# Patient Record
Sex: MALE | Race: WHITE | NOT HISPANIC OR LATINO | URBAN - NONMETROPOLITAN AREA
[De-identification: names, ages, dates, MRNs, and addresses within clinical notes are randomized per-mention and may not be internally consistent; named-entity substitution may affect disease eponyms.]

---

## 2017-03-10 ENCOUNTER — OFFICE VISIT (OUTPATIENT)
Dept: MEDICAL GROUP | Facility: PHYSICIAN GROUP | Age: 42
End: 2017-03-10
Payer: COMMERCIAL

## 2017-03-10 VITALS
OXYGEN SATURATION: 96 % | BODY MASS INDEX: 34.39 KG/M2 | HEART RATE: 82 BPM | RESPIRATION RATE: 16 BRPM | TEMPERATURE: 97.3 F | WEIGHT: 268 LBS | HEIGHT: 74 IN | SYSTOLIC BLOOD PRESSURE: 144 MMHG | DIASTOLIC BLOOD PRESSURE: 90 MMHG

## 2017-03-10 DIAGNOSIS — G47.33 OBSTRUCTIVE SLEEP APNEA SYNDROME: ICD-10-CM

## 2017-03-10 DIAGNOSIS — J34.89 NASAL OBSTRUCTION: ICD-10-CM

## 2017-03-10 DIAGNOSIS — F33.40 RECURRENT MAJOR DEPRESSIVE DISORDER, IN REMISSION (HCC): ICD-10-CM

## 2017-03-10 DIAGNOSIS — I10 ESSENTIAL HYPERTENSION: ICD-10-CM

## 2017-03-10 PROBLEM — F32.5 MAJOR DEPRESSIVE DISORDER IN REMISSION (HCC): Status: ACTIVE | Noted: 2017-03-10

## 2017-03-10 PROCEDURE — 99204 OFFICE O/P NEW MOD 45 MIN: CPT | Performed by: NURSE PRACTITIONER

## 2017-03-10 RX ORDER — LISINOPRIL 10 MG/1
10 TABLET ORAL DAILY
COMMUNITY
End: 2017-04-21 | Stop reason: SDUPTHER

## 2017-03-10 RX ORDER — FLUOXETINE HYDROCHLORIDE 40 MG/1
40 CAPSULE ORAL DAILY
COMMUNITY
End: 2017-04-21 | Stop reason: SDUPTHER

## 2017-03-10 ASSESSMENT — PATIENT HEALTH QUESTIONNAIRE - PHQ9: CLINICAL INTERPRETATION OF PHQ2 SCORE: 0

## 2017-03-10 NOTE — ASSESSMENT & PLAN NOTE
Symptoms:  Headache no , Weakness no , Visual loss no , Chest pain no , Dyspnea no , Claudication no Swelling no   Taking medication: Lisionpril 10 mg   Diet : standard diet   Exercise: limited.

## 2017-03-10 NOTE — MR AVS SNAPSHOT
"        Jesse Kasrten   3/10/2017 10:00 AM   Office Visit   MRN: [id number]    Department:  [facility]   Dept Phone:  [phone]    Description:  Male : 1975   Provider:  NOE Winters           Reason for Visit     New Patient est care / med mgmt      Allergies as of 3/10/2017     No Known Allergies      You were diagnosed with     Obstructive sleep apnea syndrome   [911173]       Nasal obstruction   [509233]       Essential hypertension   [7025752]       Recurrent major depressive disorder, in remission (CMS-[facility])   [7493643]         Vital Signs     Blood Pressure Pulse Temperature Respirations Height Weight    144/90 mmHg 82 36.3 °C (97.3 °F) 16 1.869 m (6' 1.6\") 121.564 kg (268 lb)    Body Mass Index Oxygen Saturation Smoking Status             34.80 kg/m2 96% Former Smoker         Basic Information     Date Of Birth Sex Race Ethnicity Preferred Language    1975 Male White Non- English      Your appointments     Mar 17, 2017 12:30 PM   Adult Draw/Collection with [facility] (--)    [address] [place] [address] Dr. Davida PRICE [id number] [phone]            Mar 24, 2017  9:00 AM   Telemedicine Clinic New Patient with [facility]    [address] [place] [address]  Davida PRICE [id number] [phone]            May 22, 2017 11:20 AM   Telemedicine Clinic New Patient with [facility]   Centralized Scheduling (--)    [address]  [place] PRICE [id number] [phone]              Problem List              ICD-10-CM Priority Class Noted - Resolved    Obstructive sleep apnea syndrome G47.33   3/10/2017 - Present    Nasal obstruction J34.89   3/10/2017 - Present    Essential hypertension I10   3/10/2017 - Present    Major depressive disorder in remission F32.9   3/10/2017 - Present      Health Maintenance        Date Due Completion Dates    IMM " DTaP/Tdap/Td Vaccine (1 - Tdap) 12/28/1996 ---    IMM INFLUENZA (1) 9/1/2016 ---            Current Immunizations     No immunizations on file.      Below and/or attached are the medications your provider expects you to take. Review all of your home medications and newly ordered medications with your provider and/or pharmacist. Follow medication instructions as directed by your provider and/or pharmacist. Please keep your medication list with you and share with your provider. Update the information when medications are discontinued, doses are changed, or new medications (including over-the-counter products) are added; and carry medication information at all times in the event of emergency situations     Allergies:  No Known Allergies          Medications  Valid as of: March 17, 2017 -  7:21 AM    Generic Name Brand Name Tablet Size Instructions for use    FLUoxetine HCl (Cap) PROZAC 40 MG Take 40 mg by mouth every day.        Lisinopril (Tab) PRINIVIL 10 MG Take 10 mg by mouth every day.        .                 Medicines prescribed today were sent to:     [facility] [address] [place] [id number]    Phone: [phone] Fax: [phone]    Open 24 Hours?: No      Medication refill instructions:       If your prescription bottle indicates you have medication refills left, it is not necessary to call your provider’s office. Please contact your pharmacy and they will refill your medication.    If your prescription bottle indicates you do not have any refills left, you may request refills at any time through one of the following ways: The online [facility] system (except Urgent Care), by calling your provider’s office, or by asking your pharmacy to contact your provider’s office with a refill request. Medication refills are processed only during regular business hours and may not be available until the next business day. Your provider may request  additional information or to have a follow-up visit with you prior to refilling your medication.   *Please Note: Medication refills are assigned a new Rx number when refilled electronically. Your pharmacy may indicate that no refills were authorized even though a new prescription for the same medication is available at the pharmacy. Please request the medicine by name with the pharmacy before contacting your provider for a refill.        Your To Do List     Future Labs/Procedures Complete By Expires    COMP METABOLIC PANEL  As directed 3/10/2018    VITAMIN D,25 HYDROXY  As directed 3/10/2018      Referral     A referral request has been sent to our patient care coordination department. Please allow 3-5 business days for us to process this request and contact you either by phone or mail. If you do not hear from us by the 5th business day, please call us at [phone].           [employer] Access Code: [email or web address]  Expires: 4/9/2017  1:07 PM    [employer]  A secure, online tool to manage your health information     [email or web address]’s [employer]® is a secure, online tool that connects you to your personalized health information from the privacy of your home -- day or night - making it very easy for you to manage your healthcare. Once the activation process is completed, you can even access your medical information using the [employer] geena, which is available for free in the Apple Geena store or Google Play store.     [employer] provides the following levels of access (as shown below):   My Chart Features   [facility] Primary Care Doctor [facility]  Specialists [facility]  Urgent  Care Non-[facility]  Primary Care  Doctor   Email your healthcare team securely and privately 24/7 X X X    Manage appointments: schedule your next appointment; view details of past/upcoming appointments X      Request prescription refills. X      View recent personal medical records, including lab and immunizations X X X X   View health record, including health  history, allergies, medications X X X X   Read reports about your outpatient visits, procedures, consult and ER notes X X X X   See your discharge summary, which is a recap of your hospital and/or ER visit that includes your diagnosis, lab results, and care plan. X X       How to register for [employer]:  1. Go to  [email or web address].  2. Click on the Sign Up Now box, which takes you to the New Member Sign Up page. You will need to provide the following information:  a. Enter your [employer] Access Code exactly as it appears at the top of this page. (You will not need to use this code after you’ve completed the sign-up process. If you do not sign up before the expiration date, you must request a new code.)   b. Enter your date of birth.   c. Enter your home email address.   d. Click Submit, and follow the next screen’s instructions.  3. Create a [employer] ID. This will be your [employer] login ID and cannot be changed, so think of one that is secure and easy to remember.  4. Create a [employer] password. You can change your password at any time.  5. Enter your Password Reset Question and Answer. This can be used at a later time if you forget your password.   6. Enter your e-mail address. This allows you to receive e-mail notifications when new information is available in [employer].  7. Click Sign Up. You can now view your health information.    For assistance activating your [employer] account, call [phone]

## 2017-03-13 NOTE — ASSESSMENT & PLAN NOTE
Patient diagnosed with depression some years back. Patient started on Prozac years ago. He is noticing increased anxiety with physical manifestations. He works for [employer]. His job can be stressful. He has 5 children, his wife works full time and she has health problems. He has tremors and feels is heart racing.

## 2017-03-13 NOTE — ASSESSMENT & PLAN NOTE
Patient reports abnormal nasal/sinus system. Left nares obstructed due to some abnormality. Patient was seeing ENT, then fell out of care. Would like to follow up now, as he has time and the insurance.  He snores, difficult to breath through his nose. Headaches. Nasal congestion. NO fever.

## 2017-03-13 NOTE — PROGRESS NOTES
Chief Complaint   Patient presents with   • New Patient     est care / med mgmt       HISTORY OF PRESENT ILLNESS: Patient is a @ age 41 here  today to discuss:    Interval history:     Hospitalizations - NO    Injuries NO    Illness : chronic problems.    Review of Systems   Constitutional: Negative for fever, chills, weight loss and FATIGUE  HENT: Negative for ear pain, nosebleeds, congestion, sore throat and neck pain.    Eyes: Negative for blurred vision.   Respiratory: Negative for cough, sputum production, shortness of breath and wheezing.    Cardiovascular: Negative for chest pain. HEART RACES   Gastrointestinal: Negative for heartburn, nausea, vomiting and abdominal pain.   Genitourinary: Negative for dysuria, urgency and frequency.   Musculoskeletal: Negative for myalgias, back pain and joint pain.   Skin: Negative for rash and itching.   Neurological: Negative for dizziness, tingling,TREMOERS HANDS , sensory change, focal weakness and headaches.   Endo/Heme/Allergies: Does not bruise/bleed easily.   Psychiatric/Behavioral: Negative for depression, anxiety, or memory loss.   TREATED FOR THIS.           Essential hypertension  Symptoms:  Headache no , Weakness no , Visual loss no , Chest pain no , Dyspnea no , Claudication no Swelling no   Taking medication: Lisionpril 10 mg   Diet : standard diet   Exercise: limited.       Nasal obstruction  Patient reports abnormal nasal/sinus system. Left nares obstructed due to some abnormality. Patient was seeing ENT, then fell out of care. Would like to follow up now, as he has time and the insurance.  He snores, difficult to breath through his nose. Headaches. Nasal congestion. NO fever.     Obstructive sleep apnea syndrome  Nonsmoker. Mild obesity. Snores terribly. Poor sleep, would like to have evaluation.     Major depressive disorder in remission  Patient diagnosed with depression some years back. Patient started on Prozac years ago. He is noticing increased  "anxiety with physical manifestations. He works for [employer]. His job can be stressful. He has 5 children, his wife works full time and she has health problems. He has tremors and feels is heart racing.         Allergies:Review of patient's allergies indicates no known allergies.    Current Outpatient Prescriptions Ordered in [facility]   Medication Sig Dispense Refill   • lisinopril (PRINIVIL) 10 MG Tab Take 10 mg by mouth every day.     • fluoxetine (PROZAC) 40 MG capsule Take 40 mg by mouth every day.       No current [facility]-ordered facility-administered medications on file.       Past Medical History   Diagnosis Date   • Depression    • Anxiety    • Hypertension        Social History   Substance Use Topics   • Smoking status: Former Smoker   • Smokeless tobacco: Never Used   • Alcohol Use: Yes      Comment: occ       Family Status   Relation Status Death Age   • Father     • Mother Alive      Family History   Problem Relation Age of Onset   • Heart Disease Father    • Heart Disease Mother        ROS: As documented in my HPI      Exam:  Blood pressure 144/90, pulse 82, temperature 36.3 °C (97.3 °F), resp. rate 16, height 1.869 m (6' 1.6\"), weight 121.564 kg (268 lb), SpO2 96 %.  General:  Well nourished, well developed male in NAD  Head: Nontender scalp. No lesions  Neck: Supple. Symmetric Thyroid palpated. No bruits  Pulmonary:  Normal effort. No rales, ronchi, or wheezing.  Cardiovascular: Regular rate and rhythm without murmur.   Abdomen: Soft nontender, normal bowel sounds  Extremities: no clubbing, cyanosis, or edema.  Psych: Alert and oriented x3.  Emotional: Mood appropriate  Skin: Clear no lesions  Neuro: Gait normal. Reflexes normal and symmetric. Sensation grossly normal, negative findings: speech normal, mental status intact, cranial nerves 2-12 intact    Back: Full mobilit  Neuroloygical: No focal deficits      Please note that this dictation was created using voice recognition " software. I have made every reasonable attempt to correct obvious errors, but I expect that there are errors of grammar and possibly content that I did not discover before finalizing the note.    Assessment/Plan:  1. Obstructive sleep apnea syndrome - new problem REFERRAL TO SLEEP STUDIES   2. Nasal obstruction - chronic and uncontrolled REFERRAL TO ENT   3. Essential hypertension - Current status of condition is chronic and controlled on therapy.   COMP METABOLIC PANEL    TSH+FREE T4    VITAMIN D,25 HYDROXY    LIPID PANEL   4. Recurrent major depressive disorder, in remission (CMS-HCC)  Current status of condition is chronic and controlled on therapy.

## 2017-03-17 ENCOUNTER — HOSPITAL ENCOUNTER (OUTPATIENT)
Dept: LAB | Facility: MEDICAL CENTER | Age: 42
End: 2017-03-17
Attending: NURSE PRACTITIONER
Payer: COMMERCIAL

## 2017-03-17 DIAGNOSIS — I10 ESSENTIAL HYPERTENSION: ICD-10-CM

## 2017-03-17 LAB
25(OH)D3 SERPL-MCNC: 15 NG/ML (ref 30–100)
ALBUMIN SERPL BCP-MCNC: 4.4 G/DL (ref 3.2–4.9)
ALBUMIN/GLOB SERPL: 1.4 G/DL
ALP SERPL-CCNC: 61 U/L (ref 30–99)
ALT SERPL-CCNC: 41 U/L (ref 2–50)
ANION GAP SERPL CALC-SCNC: 8 MMOL/L (ref 0–11.9)
AST SERPL-CCNC: 26 U/L (ref 12–45)
BILIRUB SERPL-MCNC: 0.5 MG/DL (ref 0.1–1.5)
BUN SERPL-MCNC: 18 MG/DL (ref 8–22)
CALCIUM SERPL-MCNC: 9.2 MG/DL (ref 8.5–10.5)
CHLORIDE SERPL-SCNC: 103 MMOL/L (ref 96–112)
CHOLEST SERPL-MCNC: 228 MG/DL (ref 100–199)
CO2 SERPL-SCNC: 24 MMOL/L (ref 20–33)
CREAT SERPL-MCNC: 0.95 MG/DL (ref 0.5–1.4)
GLOBULIN SER CALC-MCNC: 3.1 G/DL (ref 1.9–3.5)
GLUCOSE SERPL-MCNC: 131 MG/DL (ref 65–99)
HDLC SERPL-MCNC: 26 MG/DL
LDLC SERPL CALC-MCNC: ABNORMAL MG/DL
POTASSIUM SERPL-SCNC: 3.9 MMOL/L (ref 3.6–5.5)
PROT SERPL-MCNC: 7.5 G/DL (ref 6–8.2)
SODIUM SERPL-SCNC: 135 MMOL/L (ref 135–145)
T4 FREE SERPL-MCNC: 0.84 NG/DL (ref 0.53–1.43)
TRIGL SERPL-MCNC: 794 MG/DL (ref 0–149)
TSH SERPL DL<=0.005 MIU/L-ACNC: 1.49 UIU/ML (ref 0.3–3.7)

## 2017-03-17 PROCEDURE — 36415 COLL VENOUS BLD VENIPUNCTURE: CPT

## 2017-03-17 PROCEDURE — 84443 ASSAY THYROID STIM HORMONE: CPT

## 2017-03-17 PROCEDURE — 84439 ASSAY OF FREE THYROXINE: CPT

## 2017-03-17 PROCEDURE — 80061 LIPID PANEL: CPT

## 2017-03-17 PROCEDURE — 80053 COMPREHEN METABOLIC PANEL: CPT

## 2017-03-17 PROCEDURE — 82306 VITAMIN D 25 HYDROXY: CPT

## 2017-03-18 ENCOUNTER — TELEPHONE (OUTPATIENT)
Dept: MEDICAL GROUP | Facility: PHYSICIAN GROUP | Age: 42
End: 2017-03-18

## 2017-03-18 DIAGNOSIS — R73.9 ELEVATED BLOOD SUGAR: ICD-10-CM

## 2017-03-18 PROBLEM — E78.2 ELEVATED CHOLESTEROL WITH ELEVATED TRIGLYCERIDES: Status: ACTIVE | Noted: 2017-03-18

## 2017-03-18 NOTE — TELEPHONE ENCOUNTER
Call patient:  I have ordered some new labs. Patient also needs to come in to follow up.  Digna Fry

## 2017-03-22 ENCOUNTER — PATIENT MESSAGE (OUTPATIENT)
Dept: MEDICAL GROUP | Facility: PHYSICIAN GROUP | Age: 42
End: 2017-03-22

## 2017-03-22 DIAGNOSIS — J01.90 ACUTE SINUSITIS, RECURRENCE NOT SPECIFIED, UNSPECIFIED LOCATION: ICD-10-CM

## 2017-03-22 RX ORDER — AMOXICILLIN AND CLAVULANATE POTASSIUM 875; 125 MG/1; MG/1
1 TABLET, FILM COATED ORAL 2 TIMES DAILY
Qty: 20 TAB | Refills: 0 | Status: SHIPPED | OUTPATIENT
Start: 2017-03-22

## 2017-03-24 ENCOUNTER — TELEMEDICINE ORIGINATING SITE VISIT (OUTPATIENT)
Dept: MEDICAL GROUP | Facility: PHYSICIAN GROUP | Age: 42
End: 2017-03-24
Payer: COMMERCIAL

## 2017-03-24 ENCOUNTER — HOSPITAL ENCOUNTER (OUTPATIENT)
Dept: LAB | Facility: MEDICAL CENTER | Age: 42
End: 2017-03-24
Attending: NURSE PRACTITIONER
Payer: COMMERCIAL

## 2017-03-24 ENCOUNTER — APPOINTMENT (OUTPATIENT)
Dept: MEDICAL GROUP | Facility: PHYSICIAN GROUP | Age: 42
End: 2017-03-24
Payer: COMMERCIAL

## 2017-03-24 DIAGNOSIS — J34.89 NASAL OBSTRUCTION: ICD-10-CM

## 2017-03-24 DIAGNOSIS — R73.9 ELEVATED BLOOD SUGAR: ICD-10-CM

## 2017-03-24 DIAGNOSIS — G47.33 OBSTRUCTIVE SLEEP APNEA SYNDROME: ICD-10-CM

## 2017-03-24 LAB
CREAT UR-MCNC: 247.5 MG/DL
EST. AVERAGE GLUCOSE BLD GHB EST-MCNC: 148 MG/DL
HBA1C MFR BLD: 6.8 % (ref 0–5.6)
MICROALBUMIN UR-MCNC: 23 MG/DL
MICROALBUMIN/CREAT UR: 93 MG/G (ref 0–30)

## 2017-03-24 PROCEDURE — 82043 UR ALBUMIN QUANTITATIVE: CPT

## 2017-03-24 PROCEDURE — 82570 ASSAY OF URINE CREATININE: CPT

## 2017-03-24 PROCEDURE — 83036 HEMOGLOBIN GLYCOSYLATED A1C: CPT

## 2017-03-24 PROCEDURE — 36415 COLL VENOUS BLD VENIPUNCTURE: CPT

## 2017-03-26 PROBLEM — E11.9 TYPE 2 DIABETES MELLITUS WITHOUT COMPLICATION (HCC): Status: ACTIVE | Noted: 2017-03-18

## 2017-03-28 ENCOUNTER — TELEPHONE (OUTPATIENT)
Dept: MEDICAL GROUP | Facility: PHYSICIAN GROUP | Age: 42
End: 2017-03-28

## 2017-03-28 DIAGNOSIS — J34.89 NASAL OBSTRUCTION: ICD-10-CM

## 2017-03-28 DIAGNOSIS — G47.33 OBSTRUCTIVE SLEEP APNEA SYNDROME: ICD-10-CM

## 2017-03-28 RX ORDER — FLUTICASONE PROPIONATE 50 MCG
2 SPRAY, SUSPENSION (ML) NASAL DAILY
Qty: 16 G | Refills: 2 | Status: SHIPPED | OUTPATIENT
Start: 2017-03-28

## 2017-03-28 NOTE — TELEPHONE ENCOUNTER
Patient was seen by telemetry and consultation. ENT recommendations are as follows:  #1 CT without contrast of sinuses.  #2 sleep study  #3 nasal spray  Patient to follow-up with ENT    Please notify patient that I have placed orders. Assist him with obtaining CT of sinuses and explanation of sleep studies.  Digna Fry

## 2017-04-07 ENCOUNTER — HOSPITAL ENCOUNTER (OUTPATIENT)
Dept: RADIOLOGY | Facility: MEDICAL CENTER | Age: 42
End: 2017-04-07
Attending: NURSE PRACTITIONER
Payer: COMMERCIAL

## 2017-04-07 DIAGNOSIS — J34.89 NASAL OBSTRUCTION: ICD-10-CM

## 2017-04-07 DIAGNOSIS — G47.33 OBSTRUCTIVE SLEEP APNEA SYNDROME: ICD-10-CM

## 2017-04-07 PROCEDURE — 70486 CT MAXILLOFACIAL W/O DYE: CPT

## 2017-04-21 ENCOUNTER — OFFICE VISIT (OUTPATIENT)
Dept: MEDICAL GROUP | Facility: PHYSICIAN GROUP | Age: 42
End: 2017-04-21
Payer: COMMERCIAL

## 2017-04-21 VITALS
HEIGHT: 74 IN | TEMPERATURE: 98.3 F | SYSTOLIC BLOOD PRESSURE: 120 MMHG | WEIGHT: 271 LBS | HEART RATE: 92 BPM | OXYGEN SATURATION: 95 % | RESPIRATION RATE: 14 BRPM | DIASTOLIC BLOOD PRESSURE: 70 MMHG | BODY MASS INDEX: 34.78 KG/M2

## 2017-04-21 DIAGNOSIS — E11.9 TYPE 2 DIABETES MELLITUS WITHOUT COMPLICATION, WITHOUT LONG-TERM CURRENT USE OF INSULIN (HCC): ICD-10-CM

## 2017-04-21 DIAGNOSIS — F33.40 RECURRENT MAJOR DEPRESSIVE DISORDER, IN REMISSION (HCC): ICD-10-CM

## 2017-04-21 DIAGNOSIS — I10 ESSENTIAL HYPERTENSION: ICD-10-CM

## 2017-04-21 DIAGNOSIS — E78.2 ELEVATED CHOLESTEROL WITH ELEVATED TRIGLYCERIDES: ICD-10-CM

## 2017-04-21 PROCEDURE — 99214 OFFICE O/P EST MOD 30 MIN: CPT | Performed by: NURSE PRACTITIONER

## 2017-04-21 RX ORDER — ATORVASTATIN CALCIUM 20 MG/1
20 TABLET, FILM COATED ORAL DAILY
Qty: 90 TAB | Refills: 1 | Status: SHIPPED | OUTPATIENT
Start: 2017-04-21

## 2017-04-21 RX ORDER — LISINOPRIL 10 MG/1
10 TABLET ORAL DAILY
Qty: 90 TAB | Refills: 1 | Status: SHIPPED | OUTPATIENT
Start: 2017-04-21

## 2017-04-21 RX ORDER — FLUOXETINE HYDROCHLORIDE 40 MG/1
40 CAPSULE ORAL DAILY
Qty: 90 CAP | Refills: 1 | Status: SHIPPED | OUTPATIENT
Start: 2017-04-21

## 2017-04-21 RX ORDER — MONTELUKAST SODIUM 10 MG/1
10 TABLET ORAL DAILY
Qty: 30 TAB | Refills: 3 | Status: SHIPPED | OUTPATIENT
Start: 2017-04-21 | End: 2017-09-14 | Stop reason: SDUPTHER

## 2017-04-21 RX ORDER — METFORMIN HYDROCHLORIDE 500 MG/1
500 TABLET, EXTENDED RELEASE ORAL DAILY
Qty: 90 TAB | Refills: 1 | Status: SHIPPED | OUTPATIENT
Start: 2017-04-21

## 2017-04-21 NOTE — ASSESSMENT & PLAN NOTE
MEDICATIONS:  NONE new start     HGBA1C -  6.8     Meter?  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

## 2017-04-21 NOTE — ASSESSMENT & PLAN NOTE
Symptoms:  Headache no , Weakness no , Visual loss no , Chest pain no , Dyspnea no , Claudication no Swelling no   Taking medication : lisinopril  Diet  Standard diet   Exercise: walking at work.

## 2017-04-21 NOTE — PROGRESS NOTES
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    Essential hypertension  Symptoms:  Headache no , Weakness no , Visual loss no , Chest pain no , Dyspnea no , Claudication no Swelling no   Taking medication : lisinopril  Diet  Standard diet   Exercise: walking at work.       Elevated cholesterol with elevated triglycerides  Patient has elevated cholesterol. Patient to start statin with counseling.   Results for YAMIL KELLEY (MRN [id number]) as of 4/21/2017 14:24   Ref. Range 3/17/2017 07:13   Cholesterol,Tot Latest Ref Range: 100-199 mg/dL 228 (H)   Triglycerides Latest Ref Range: 0-149 mg/dL 794 (H)   HDL Latest Ref Range: >=40 mg/dL 26 (A)         Allergies:Review of patient's allergies indicates no known allergies.    Current Outpatient Prescriptions Ordered in [facility]   Medication Sig Dispense Refill   • montelukast (SINGULAIR) 10 MG Tab Take 1 Tab by mouth every day. 30 Tab 3   • fluoxetine (PROZAC) 40 MG capsule Take 1 Cap by mouth every day. 90 Cap 1   • metformin ER (GLUCOPHAGE XR) 500 MG TABLET SR 24 HR Take 1 Tab by mouth every day. 90 Tab 1   • lisinopril (PRINIVIL) 10 MG Tab Take 1 Tab by mouth every day. 90 Tab 1   • atorvastatin (LIPITOR) 20 MG Tab  "Take 1 Tab by mouth every day. 90 Tab 1   • fluticasone (FLONASE) 50 MCG/ACT nasal spray Spray 2 Sprays in nose every day. 16 g 2   • amoxicillin-clavulanate (AUGMENTIN) 875-125 MG Tab Take 1 Tab by mouth 2 times a day. 20 Tab 0     No current Epic-ordered facility-administered medications on file.       Past Medical History   Diagnosis Date   • Depression    • Anxiety    • Hypertension        Social History   Substance Use Topics   • Smoking status: Former Smoker   • Smokeless tobacco: Never Used   • Alcohol Use: Yes      Comment: occ       Family Status   Relation Status Death Age   • Father     • Mother Alive      Family History   Problem Relation Age of Onset   • Heart Disease Father    • Heart Disease Mother        ROS: As documented in my HPI      Exam:  Blood pressure 120/70, pulse 92, temperature 36.8 °C (98.3 °F), resp. rate 14, height 1.869 m (6' 1.6\"), weight 122.925 kg (271 lb), SpO2 95 %.  General:  Well nourished, well developed male in NAD  HEENT: Eyes conjunctiva is clear, lids without ptosis, pupils equal round and reactive to light and accommodation.  Ears normal shape and contour, canals are clear bilaterally, TMs with good light reflex and appear normal.  Nasal mucosa is red and edematous. Oropharynx uvula swollen lots of postnasal drip Sinuses (frontal and maxillary) nontender to palpation.  Head: Nontender scalp. No lesions  Neck: Supple. Symmetric Thyroid palpated. No bruits  Pulmonary:  Normal effort. No rales, ronchi, or wheezing.  Cardiovascular: Regular rate and rhythm without murmur.   Abdomen: Soft nontender, normal bowel sounds  Extremities: no clubbing, cyanosis, or edema.  Psych: Alert and oriented x3.    Neurological: No focal deficits    Please note that this dictation was created using voice recognition software. I have made every reasonable attempt to correct obvious errors, but I expect that there are errors of grammar and possibly content that I did not discover before " finalizing the note.    Assessment/Plan:  1. Type 2 diabetes mellitus without complication, without long-term current use of insulin (CMS-[facility])  New start for medication.    2. Recurrent major depressive disorder, in remission (CMS-HCC)  Current status of condition is chronic and controlled on therapy.     3. Essential hypertension  Current status of condition is chronic and controlled on therapy.     4. Elevated cholesterol with elevated triglycerides  New problem that is not well controlled

## 2017-04-21 NOTE — ASSESSMENT & PLAN NOTE
Patient has elevated cholesterol. Patient to start statin with counseling.   Results for YAMIL KELLEY (MRN [id number]) as of 4/21/2017 14:24   Ref. Range 3/17/2017 07:13   Cholesterol,Tot Latest Ref Range: 100-199 mg/dL 228 (H)   Triglycerides Latest Ref Range: 0-149 mg/dL 794 (H)   HDL Latest Ref Range: >=40 mg/dL 26 (A)

## 2017-04-21 NOTE — MR AVS SNAPSHOT
"Jesse Shelley   2017 2:20 PM   Office Visit   MRN: [id number]    Department:  [facility]   Dept Phone:  [phone]    Description:  Male : 1975   Provider:  NOE Winters           Reason for Visit     Results also med refills      Allergies as of 2017     No Known Allergies      You were diagnosed with     Type 2 diabetes mellitus without complication, without long-term current use of insulin (CMS-HCC)   [3178451]       Recurrent major depressive disorder, in remission (CMS-HCC)   [8137712]       Essential hypertension   [3329164]       Elevated cholesterol with elevated triglycerides   [864960]         Vital Signs     Blood Pressure Pulse Temperature Respirations Height Weight    120/70 mmHg 92 36.8 °C (98.3 °F) 14 1.869 m (6' 1.6\") 122.925 kg (271 lb)    Body Mass Index Oxygen Saturation Smoking Status             35.19 kg/m2 95% Former Smoker         Basic Information     Date Of Birth Sex Race Ethnicity Preferred Language    1975 Male White Non- English      Your appointments     May 22, 2017 11:20 AM   Telemedicine Clinic [facility], [facility], [facility] [place]   Centralized Scheduling (--)    [address]  [place] [id number] [phone]              Problem List              ICD-10-CM Priority Class Noted - Resolved    Obstructive sleep apnea syndrome G47.33   3/10/2017 - Present    Nasal obstruction J34.89   3/10/2017 - Present    Essential hypertension I10   3/10/2017 - Present    Major depressive disorder in remission F32.9   3/10/2017 - Present    Elevated cholesterol with elevated triglycerides E78.2   3/18/2017 - Present    Type 2 diabetes mellitus without complication (CMS-HCC) E11.9   3/18/2017 - Present      Health Maintenance        Date Due Completion Dates    IMM HEP B VACCINE (1 of 3 - Primary Series) 1975 ---    DIABETES MONOFILAMENT / LE EXAM 1978 ---    RETINAL " SCREENING 12/28/1995 ---    IMM DTaP/Tdap/Td Vaccine (1 - Tdap) 12/28/1996 ---    IMM PNEUMOCOCCAL 19-64 (ADULT) MEDIUM RISK SERIES (1 of 1 - PPSV23) 12/28/1996 ---    A1C SCREENING 9/24/2017 3/24/2017    FASTING LIPID PROFILE 3/17/2018 3/17/2017    SERUM CREATININE 3/17/2018 3/17/2017    URINE ACR / MICROALBUMIN 3/24/2018 3/24/2017            Current Immunizations     No immunizations on file.      Below and/or attached are the medications your provider expects you to take. Review all of your home medications and newly ordered medications with your provider and/or pharmacist. Follow medication instructions as directed by your provider and/or pharmacist. Please keep your medication list with you and share with your provider. Update the information when medications are discontinued, doses are changed, or new medications (including over-the-counter products) are added; and carry medication information at all times in the event of emergency situations     Allergies:  No Known Allergies          Medications  Valid as of: April 21, 2017 -  3:11 PM    Generic Name Brand Name Tablet Size Instructions for use    Amoxicillin-Pot Clavulanate (Tab) AUGMENTIN 875-125 MG Take 1 Tab by mouth 2 times a day.        Atorvastatin Calcium (Tab) LIPITOR 20 MG Take 1 Tab by mouth every day.        FLUoxetine HCl (Cap) PROZAC 40 MG Take 1 Cap by mouth every day.        Fluticasone Propionate (Suspension) FLONASE 50 MCG/ACT Spray 2 Sprays in nose every day.        Lisinopril (Tab) PRINIVIL 10 MG Take 1 Tab by mouth every day.        MetFORMIN HCl (TABLET SR 24 HR) GLUCOPHAGE  MG Take 1 Tab by mouth every day.        Montelukast Sodium (Tab) SINGULAIR 10 MG Take 1 Tab by mouth every day.        .                 Medicines prescribed today were sent to:     [facility] - [address]    [address] [place] [address]    Phone: [phone] Fax: [phone]    Open 24 Hours?: No         Medication refill instructions:       If your prescription bottle indicates you have medication refills left, it is not necessary to call your provider’s office. Please contact your pharmacy and they will refill your medication.    If your prescription bottle indicates you do not have any refills left, you may request refills at any time through one of the following ways: The online [employer] system (except Urgent Care), by calling your provider’s office, or by asking your pharmacy to contact your provider’s office with a refill request. Medication refills are processed only during regular business hours and may not be available until the next business day. Your provider may request additional information or to have a follow-up visit with you prior to refilling your medication.   *Please Note: Medication refills are assigned a new Rx number when refilled electronically. Your pharmacy may indicate that no refills were authorized even though a new prescription for the same medication is available at the pharmacy. Please request the medicine by name with the pharmacy before contacting your provider for a refill.           [employer] Access Code: Activation code not generated  Current [employer] Status: Active

## 2017-07-10 ENCOUNTER — TELEMEDICINE2 (OUTPATIENT)
Dept: SCHEDULING | Facility: IMAGING CENTER | Age: 42
End: 2017-07-10
Payer: COMMERCIAL

## 2017-07-10 ENCOUNTER — TELEMEDICINE ORIGINATING SITE VISIT (OUTPATIENT)
Dept: MEDICAL GROUP | Facility: PHYSICIAN GROUP | Age: 42
End: 2017-07-10
Payer: COMMERCIAL

## 2017-07-10 VITALS
WEIGHT: 270 LBS | RESPIRATION RATE: 12 BRPM | DIASTOLIC BLOOD PRESSURE: 92 MMHG | OXYGEN SATURATION: 97 % | BODY MASS INDEX: 35.78 KG/M2 | HEIGHT: 73 IN | HEART RATE: 82 BPM | SYSTOLIC BLOOD PRESSURE: 120 MMHG | TEMPERATURE: 97.6 F

## 2017-07-10 DIAGNOSIS — G47.33 OBSTRUCTIVE SLEEP APNEA SYNDROME: ICD-10-CM

## 2017-07-10 DIAGNOSIS — F33.40 RECURRENT MAJOR DEPRESSIVE DISORDER, IN REMISSION (HCC): ICD-10-CM

## 2017-07-10 DIAGNOSIS — I10 ESSENTIAL HYPERTENSION: ICD-10-CM

## 2017-07-10 DIAGNOSIS — J34.89 NASAL OBSTRUCTION: ICD-10-CM

## 2017-07-10 PROCEDURE — 99204 OFFICE O/P NEW MOD 45 MIN: CPT | Mod: GT | Performed by: INTERNAL MEDICINE

## 2017-07-10 NOTE — PROGRESS NOTES
Jesse Shelley is a 41 y.o. male here for his sleep apnea with hypertension and excessive weight. Patient was referred by his primary care doctor.    History of Present Illness:      This patient is interviewed by telemedicine in [place]. He has his wife present, she indicates marked snoring, periodic breathing, resuscitative snorts. He is sleepy during the day. I cautioned him not to drive when sleepy. His wife indicates this is severe. He has a markedly crowded oropharynx, excessive weight, portion control and exercise are advised for the weight, sleep apnea is very likely and we will do a home sleep study, then follow-up to decide if he needs in-house titration or home auto CPAP. He does have right nasal airflow obstruction, is scheduled to see an ear nose and throat doctor. Questions from his wife and patient were answered, review of expectations, and follow-up is planned    Constitutional ROS: No unexpected change in weight, No unexplained fevers  Eyes: No change in vision or blurring or double vision  Mouth/Throat ROS: No sore throat, No recent change in voice or hoarseness  Pulmonary ROS: See present history for pertinent positives  Cardiovascular ROS: No chest pain to suggest acute coronary syndrome  Gastrointestinal ROS: No abdominal pain to suggest peptic disease  Musculoskeletal/Extremities ROS: no acute artritis or unusual swelling  Hematologic/Lymphatic ROS: No easy bleeding or unusual lymph node swelling  Neurologic ROS: No new or unusual weakness  Psychiatric ROS: No hallucinations  Allergic/Immunologic: No  urticaria or allergic rash      Current Outpatient Prescriptions   Medication Sig Dispense Refill   • montelukast (SINGULAIR) 10 MG Tab Take 1 Tab by mouth every day. 30 Tab 3   • fluoxetine (PROZAC) 40 MG capsule Take 1 Cap by mouth every day. 90 Cap 1   • metformin ER (GLUCOPHAGE XR) 500 MG TABLET SR 24 HR Take 1 Tab by mouth every day. 90 Tab 1   • lisinopril (PRINIVIL) 10 MG Tab Take 1 Tab by  "mouth every day. 90 Tab 1   • atorvastatin (LIPITOR) 20 MG Tab Take 1 Tab by mouth every day. 90 Tab 1   • fluticasone (FLONASE) 50 MCG/ACT nasal spray Spray 2 Sprays in nose every day. 16 g 2   • amoxicillin-clavulanate (AUGMENTIN) 875-125 MG Tab Take 1 Tab by mouth 2 times a day. 20 Tab 0     No current facility-administered medications for this visit.       Social History   Substance Use Topics   • Smoking status: Former Smoker -- 1.00 packs/day for 12 years     Types: Cigarettes     Quit date: 01/01/2016   • Smokeless tobacco: Never Used   • Alcohol Use: Yes      Comment: occ        Past Medical History   Diagnosis Date   • Depression    • Anxiety    • Hypertension    • Chickenpox        History reviewed. No pertinent past surgical history.    Allergies: Review of patient's allergies indicates no known allergies.    Family History   Problem Relation Age of Onset   • Heart Disease Father    • Heart Disease Mother        Physical Examination    Filed Vitals:    07/10/17 1000   Height: 1.854 m (6' 0.99\")   Weight: 122.471 kg (270 lb)   Weight % change since last entry.: 0 %   BP: 120/92   Pulse: 82   BMI (Calculated): 35.63   Resp: 12   Temp: 36.4 °C (97.6 °F)   O2 sat % room air: 97 %       General Appearance: alert, no distress  Skin: Skin color, texture, turgor normal. No rashes or lesions.  Eyes: negative  Oropharynx: Lips, mucosa, and tongue normal. Teeth and gums normal. Oropharynx moist and without lesion  Lungs: positive findings: Scattered rhonchi according to the medical assistant exam  Heart: negative. RRR without murmur, gallop, or rubs.  No ectopy.  Abdomen: Abdomen soft, non-tender. . No masses,  No organomegaly  Extremities:  No deformities, edema, or skin discoloration  Joints: No acute arthritis  Peripheral Pulses:perfused  Neurologic: intact grossly  Crowded airway without pathology according to medical assistant exam; no flow through right nostril by external exam    IV (only hard palate " visible)    Imaging: None presently    PFTS: None presently      Assessment and Plan  1. Obstructive sleep apnea syndrome  - OVERNIGHT HOME SLEEP STUDY; Future    2. Nasal obstruction; scheduled to see her dosing throat doctor for right nasal obstruction    3. Essential hypertension  On medication, control sleep apnea which may help    4. Recurrent major depressive disorder, in remission (CMS-[facility])      This patient is interviewed by telemedicine in [place]. He has his wife present, she indicates marked snoring, periodic breathing, resuscitative snorts. He is sleepy during the day. I cautioned him not to drive when sleepy. His wife indicates this is severe. He has a markedly crowded oropharynx, excessive weight, portion control and exercise are advised for the weight, sleep apnea is very likely and we will do a home sleep study, then follow-up to decide if he needs in-house titration or home auto CPAP. He does have right nasal airflow obstruction, is scheduled to see an ear nose and throat doctor. Questions from his wife and patient were answered, review of expectations, and follow-up is planned    Followup Return for after sleep study.

## 2017-08-22 ENCOUNTER — PATIENT MESSAGE (OUTPATIENT)
Dept: MEDICAL GROUP | Facility: PHYSICIAN GROUP | Age: 42
End: 2017-08-22

## 2017-09-08 ENCOUNTER — PATIENT MESSAGE (OUTPATIENT)
Dept: MEDICAL GROUP | Facility: PHYSICIAN GROUP | Age: 42
End: 2017-09-08

## 2017-09-15 RX ORDER — MONTELUKAST SODIUM 10 MG/1
TABLET ORAL
Qty: 90 TAB | Refills: 1 | Status: SHIPPED | OUTPATIENT
Start: 2017-09-15

## 2022-03-07 ENCOUNTER — OFFICE VISIT (OUTPATIENT)
Dept: URBAN - METROPOLITAN AREA CLINIC 109 | Facility: CLINIC | Age: 47
End: 2022-03-07

## 2022-03-16 ENCOUNTER — WEB ENCOUNTER (OUTPATIENT)
Dept: URBAN - METROPOLITAN AREA CLINIC 109 | Facility: CLINIC | Age: 47
End: 2022-03-16

## 2022-03-21 ENCOUNTER — OFFICE VISIT (OUTPATIENT)
Dept: URBAN - METROPOLITAN AREA CLINIC 109 | Facility: CLINIC | Age: 47
End: 2022-03-21
Payer: COMMERCIAL

## 2022-03-21 ENCOUNTER — LAB OUTSIDE AN ENCOUNTER (OUTPATIENT)
Dept: URBAN - METROPOLITAN AREA CLINIC 109 | Facility: CLINIC | Age: 47
End: 2022-03-21

## 2022-03-21 ENCOUNTER — WEB ENCOUNTER (OUTPATIENT)
Dept: URBAN - METROPOLITAN AREA CLINIC 109 | Facility: CLINIC | Age: 47
End: 2022-03-21

## 2022-03-21 ENCOUNTER — DASHBOARD ENCOUNTERS (OUTPATIENT)
Age: 47
End: 2022-03-21

## 2022-03-21 DIAGNOSIS — Z80.0 FAMILY HISTORY OF COLON CANCER: ICD-10-CM

## 2022-03-21 PROCEDURE — 99202 OFFICE O/P NEW SF 15 MIN: CPT | Performed by: INTERNAL MEDICINE

## 2022-03-21 NOTE — HPI-TODAY'S VISIT:
The patient has been referred for colon screening. His sister had colon cancer at age 51. Mother had breast cancer. An older sister  of bladder cancer. No prior studies. Denies change in bowel habits, blood in the stool abdominal pain or weight loss.  PMH- unremarkable.

## 2022-04-21 ENCOUNTER — OFFICE VISIT (OUTPATIENT)
Dept: URBAN - METROPOLITAN AREA SURGERY CENTER 23 | Facility: SURGERY CENTER | Age: 47
End: 2022-04-21
Payer: COMMERCIAL

## 2022-04-21 DIAGNOSIS — Z80.0 BROTHER AT YOUNG AGE FAMILY HISTORY OF COLON CANCER: ICD-10-CM

## 2022-04-21 PROCEDURE — G8907 PT DOC NO EVENTS ON DISCHARG: HCPCS | Performed by: INTERNAL MEDICINE

## 2022-04-21 PROCEDURE — G0105 COLORECTAL SCRN; HI RISK IND: HCPCS | Performed by: INTERNAL MEDICINE
